# Patient Record
Sex: MALE | Race: WHITE | Employment: FULL TIME | URBAN - METROPOLITAN AREA
[De-identification: names, ages, dates, MRNs, and addresses within clinical notes are randomized per-mention and may not be internally consistent; named-entity substitution may affect disease eponyms.]

---

## 2017-11-29 ENCOUNTER — OFFICE VISIT (OUTPATIENT)
Dept: DERMATOLOGY | Age: 57
End: 2017-11-29

## 2017-11-29 DIAGNOSIS — L91.8 CUTANEOUS SKIN TAGS: ICD-10-CM

## 2017-11-29 DIAGNOSIS — L72.0 EPIDERMOID CYST: Primary | ICD-10-CM

## 2017-11-29 DIAGNOSIS — L82.1 SK (SEBORRHEIC KERATOSIS): ICD-10-CM

## 2017-11-29 PROCEDURE — 11200 RMVL SKIN TAGS UP TO&INC 15: CPT | Performed by: DERMATOLOGY

## 2017-11-29 PROCEDURE — 99213 OFFICE O/P EST LOW 20 MIN: CPT | Performed by: DERMATOLOGY

## 2017-11-29 NOTE — PROGRESS NOTES
declined any local anesthesia. There was minor bleeding but no complications. Return in about 1 year (around 11/29/2018).

## 2018-05-08 ENCOUNTER — OFFICE VISIT (OUTPATIENT)
Dept: INTERNAL MEDICINE CLINIC | Age: 58
End: 2018-05-08

## 2018-05-08 VITALS
BODY MASS INDEX: 33.74 KG/M2 | HEART RATE: 68 BPM | HEIGHT: 67 IN | WEIGHT: 215 LBS | SYSTOLIC BLOOD PRESSURE: 112 MMHG | TEMPERATURE: 98 F | DIASTOLIC BLOOD PRESSURE: 62 MMHG | RESPIRATION RATE: 16 BRPM

## 2018-05-08 DIAGNOSIS — Z00.00 PREVENTATIVE HEALTH CARE: Primary | ICD-10-CM

## 2018-05-08 DIAGNOSIS — E78.2 MIXED HYPERLIPIDEMIA: ICD-10-CM

## 2018-05-08 PROCEDURE — 99386 PREV VISIT NEW AGE 40-64: CPT | Performed by: INTERNAL MEDICINE

## 2018-05-08 RX ORDER — SIMVASTATIN 10 MG
10 TABLET ORAL NIGHTLY
COMMUNITY
End: 2018-05-08 | Stop reason: SDUPTHER

## 2018-05-08 RX ORDER — SIMVASTATIN 10 MG
10 TABLET ORAL NIGHTLY
Qty: 90 TABLET | Refills: 3 | Status: SHIPPED | OUTPATIENT
Start: 2018-05-08

## 2018-05-08 ASSESSMENT — PATIENT HEALTH QUESTIONNAIRE - PHQ9
1. LITTLE INTEREST OR PLEASURE IN DOING THINGS: 0
SUM OF ALL RESPONSES TO PHQ QUESTIONS 1-9: 0
2. FEELING DOWN, DEPRESSED OR HOPELESS: 0
SUM OF ALL RESPONSES TO PHQ9 QUESTIONS 1 & 2: 0

## 2018-05-09 ASSESSMENT — ENCOUNTER SYMPTOMS
EYE DISCHARGE: 0
ABDOMINAL DISTENTION: 0
CONSTIPATION: 0
BACK PAIN: 0
SHORTNESS OF BREATH: 0
ABDOMINAL PAIN: 0
WHEEZING: 0
COUGH: 0
SORE THROAT: 0
DIARRHEA: 0
BLOOD IN STOOL: 0
NAUSEA: 0
TROUBLE SWALLOWING: 0
RHINORRHEA: 0
VOMITING: 0
APNEA: 0
EYE PAIN: 0

## 2019-03-25 ENCOUNTER — TELEPHONE (OUTPATIENT)
Dept: DERMATOLOGY | Age: 59
End: 2019-03-25

## 2019-04-04 ENCOUNTER — OFFICE VISIT (OUTPATIENT)
Dept: DERMATOLOGY | Age: 59
End: 2019-04-04
Payer: COMMERCIAL

## 2019-04-04 DIAGNOSIS — L91.8 CUTANEOUS SKIN TAGS: ICD-10-CM

## 2019-04-04 DIAGNOSIS — L82.1 SEBORRHEIC KERATOSIS: Primary | ICD-10-CM

## 2019-04-04 DIAGNOSIS — Z80.8 FAMILY HISTORY OF SKIN CANCER: ICD-10-CM

## 2019-04-04 PROCEDURE — 99213 OFFICE O/P EST LOW 20 MIN: CPT | Performed by: DERMATOLOGY

## 2019-04-04 NOTE — PROGRESS NOTES
[facility]  Eben Hood MD  [phone]      Shasta Dykes  1960    62 y.o. male     Date of Visit: 4/4/2019    Chief Complaint: skin lesions    History of Present Illness:    1. He complains of a concerning lesion on the right flank. He has multiple other similar lesions on the trunk. 2.  He complains of a lesion lateral to the left eye. Dad with history of skin cancer. Review of Systems:  Skin: No new or changing moles. Past Medical History, Family History, Surgical History, Medications and Allergies reviewed. Past Medical History:   Diagnosis Date    Hyperlipidemia      History reviewed. No pertinent surgical history. No Known Allergies  Outpatient Medications Marked as Taking for the 4/4/19 encounter (Office Visit) with Norman Guzman MD   Medication Sig Dispense Refill    zoster recombinant adjuvanted vaccine [facility]) 50 MCG SUSR injection Inject 0.5 ml intramuscularly now and repeat dose in 2 months 1 each 1    simvastatin (ZOCOR) 10 MG tablet Take 1 tablet by mouth nightly 90 tablet 3    aspirin 81 MG tablet Take 81 mg by mouth daily. Physical Examination       The following were examined and determined to be normal: Psych/Neuro, Scalp/hair, Head/face, Conjunctivae/eyelids, Gums/teeth/lips, Neck, Breast/axilla/chest, Abdomen, Back, RUE, LUE, RLE, LLE and Nails/digits. The following were examined and determined to be abnormal: None. Well-appearing. 1.  Scattered on the trunk and face are multiple stuck on appearing verrucous brown papules and plaques. 2.  Left temple with a small pedunculated skin-colored papule. Assessment and Plan     1. Seborrheic keratoses -     Reassurance. 1 on the right flank treated with LN2:  2 cycles of liquid nitrogen applied to the SK on the right lateral flank (no charge).  Patient was educated regarding the potential risks of blister formation, discomfort, hypopigmentation,

## 2019-07-10 ENCOUNTER — HOSPITAL ENCOUNTER (OUTPATIENT)
Age: 59
Setting detail: OUTPATIENT SURGERY
Discharge: HOME | End: 2019-07-10
Attending: INTERNAL MEDICINE | Admitting: INTERNAL MEDICINE
Payer: COMMERCIAL

## 2019-07-10 VITALS
BODY MASS INDEX: 33.75 KG/M2 | TEMPERATURE: 97 F | SYSTOLIC BLOOD PRESSURE: 110 MMHG | WEIGHT: 210 LBS | DIASTOLIC BLOOD PRESSURE: 68 MMHG | HEART RATE: 60 BPM | HEIGHT: 66 IN | RESPIRATION RATE: 18 BRPM | OXYGEN SATURATION: 95 %

## 2019-07-10 PROCEDURE — 3609010300 HC COLONOSCOPY W/BIOPSY SINGLE/MULTIPLE: Performed by: INTERNAL MEDICINE

## 2019-07-10 PROCEDURE — 7100000010 HC PHASE II RECOVERY - FIRST 15 MIN: Performed by: INTERNAL MEDICINE

## 2019-07-10 PROCEDURE — 88305 TISSUE EXAM BY PATHOLOGIST: CPT

## 2019-07-10 PROCEDURE — 6360000002 HC RX W HCPCS: Performed by: INTERNAL MEDICINE

## 2019-07-10 PROCEDURE — 2709999900 HC NON-CHARGEABLE SUPPLY: Performed by: INTERNAL MEDICINE

## 2019-07-10 PROCEDURE — 99152 MOD SED SAME PHYS/QHP 5/>YRS: CPT | Performed by: INTERNAL MEDICINE

## 2019-07-10 PROCEDURE — 7100000011 HC PHASE II RECOVERY - ADDTL 15 MIN: Performed by: INTERNAL MEDICINE

## 2019-07-10 RX ORDER — MIDAZOLAM HYDROCHLORIDE 1 MG/ML
INJECTION INTRAMUSCULAR; INTRAVENOUS PRN
Status: DISCONTINUED | OUTPATIENT
Start: 2019-07-10 | End: 2019-07-10 | Stop reason: ALTCHOICE

## 2019-07-10 RX ORDER — MEPERIDINE HYDROCHLORIDE 50 MG/ML
INJECTION INTRAMUSCULAR; INTRAVENOUS; SUBCUTANEOUS PRN
Status: DISCONTINUED | OUTPATIENT
Start: 2019-07-10 | End: 2019-07-10 | Stop reason: ALTCHOICE

## 2019-07-10 ASSESSMENT — PAIN - FUNCTIONAL ASSESSMENT: PAIN_FUNCTIONAL_ASSESSMENT: 0-10

## 2019-07-10 NOTE — H&P
History and Physical / Pre-Sedation Assessment    Dianna Rogers is a 62 y.o. male who presents today for colonoscopy procedure. PMHx:    Past Medical History:   Diagnosis Date    Hyperlipidemia        Medications:    Prior to Admission medications    Medication Sig Start Date End Date Taking? Authorizing Provider   simvastatin (ZOCOR) 10 MG tablet Take 1 tablet by mouth nightly 18  Yes Gayla Jolley MD       Allergies: No Known Allergies    PSHx:  History reviewed. No pertinent surgical history.     Social Hx:    Social History     Socioeconomic History    Marital status:      Spouse name: Not on file    Number of children: Not on file    Years of education: Not on file    Highest education level: Not on file   Occupational History    Not on file   Social Needs    Financial resource strain: Not on file    Food insecurity:     Worry: Not on file     Inability: Not on file    Transportation needs:     Medical: Not on file     Non-medical: Not on file   Tobacco Use    Smoking status: Former Smoker     Last attempt to quit:      Years since quittin.5    Smokeless tobacco: Never Used   Substance and Sexual Activity    Alcohol use: Yes     Comment: Two drinks weekly    Drug use: No    Sexual activity: Yes     Partners: Female   Lifestyle    Physical activity:     Days per week: Not on file     Minutes per session: Not on file    Stress: Not on file   Relationships    Social connections:     Talks on phone: Not on file     Gets together: Not on file     Attends Temple service: Not on file     Active member of club or organization: Not on file     Attends meetings of clubs or organizations: Not on file     Relationship status: Not on file    Intimate partner violence:     Fear of current or ex partner: Not on file     Emotionally abused: Not on file     Physically abused: Not on file     Forced sexual activity: Not on file   Other Topics Concern    Not on file   Social

## 2019-07-10 NOTE — PROGRESS NOTES
Justice Vo is a 62 y.o. male patient. No current facility-administered medications for this encounter. No Known Allergies  Active Problems:    * No active hospital problems. *  Resolved Problems:    * No resolved hospital problems. *    Blood pressure 110/67, pulse 60, temperature 97.9 °F (36.6 °C), resp. rate 18, height 5' 6\" (1.676 m), weight 210 lb (95.3 kg), SpO2 97 %. Subjective:  Symptoms:  Stable. Diet:  Adequate intake. Activity level: Normal.    Pain:  He reports no pain. Objective:  General Appearance:  Comfortable, well-appearing and in no acute distress. Vital signs: (most recent): Blood pressure 110/67, pulse 60, temperature 97.9 °F (36.6 °C), resp. rate 18, height 5' 6\" (1.676 m), weight 210 lb (95.3 kg), SpO2 97 %. Vital signs are normal.    Output: Producing urine and producing stool. Lungs:  Normal effort. Heart: Normal rate. Abdomen: Abdomen is soft. There is no abdominal tenderness. Extremities: Normal range of motion. Neurological: Patient is alert and oriented to person, place and time. Skin:  Warm and dry.       Assessment & Plan    Shannon Shabazz RN  7/10/2019

## 2020-03-10 ENCOUNTER — OFFICE VISIT (OUTPATIENT)
Dept: DERMATOLOGY | Age: 60
End: 2020-03-10
Payer: COMMERCIAL

## 2020-03-10 PROCEDURE — 1036F TOBACCO NON-USER: CPT | Performed by: DERMATOLOGY

## 2020-03-10 PROCEDURE — G8427 DOCREV CUR MEDS BY ELIG CLIN: HCPCS | Performed by: DERMATOLOGY

## 2020-03-10 PROCEDURE — 99213 OFFICE O/P EST LOW 20 MIN: CPT | Performed by: DERMATOLOGY

## 2020-03-10 PROCEDURE — 3017F COLORECTAL CA SCREEN DOC REV: CPT | Performed by: DERMATOLOGY

## 2020-03-10 PROCEDURE — G8417 CALC BMI ABV UP PARAM F/U: HCPCS | Performed by: DERMATOLOGY

## 2020-03-10 PROCEDURE — G8484 FLU IMMUNIZE NO ADMIN: HCPCS | Performed by: DERMATOLOGY

## 2020-03-10 NOTE — PROGRESS NOTES
insulin resistance. Encouraged yearly physicals with PCP. Return in about 1 year (around 3/10/2021).

## 2021-08-12 ENCOUNTER — OFFICE VISIT (OUTPATIENT)
Dept: DERMATOLOGY | Age: 61
End: 2021-08-12
Payer: COMMERCIAL

## 2021-08-12 VITALS — TEMPERATURE: 97.8 F

## 2021-08-12 DIAGNOSIS — L83 ACANTHOSIS NIGRICANS: ICD-10-CM

## 2021-08-12 DIAGNOSIS — L82.1 SK (SEBORRHEIC KERATOSIS): Primary | ICD-10-CM

## 2021-08-12 PROCEDURE — 99212 OFFICE O/P EST SF 10 MIN: CPT | Performed by: DERMATOLOGY

## 2021-08-12 NOTE — PROGRESS NOTES
[facility]  Danial Bloom MD  [phone]      Mellisa Mckeon  1960    59 y.o. male     Date of Visit: 8/12/2021    Chief Complaint: skin lesions    History of Present Illness:    1. He presents today for evaluation of multiple lesions on the scalp, trunk and extremities. 2.  He also has asymptomatic skin changes in the axillae. Review of Systems:  Gen: Feels well, good sense of health. Past Medical History, Family History, Surgical History, Medications and Allergies reviewed. Past Medical History:   Diagnosis Date    Hyperlipidemia      Past Surgical History:   Procedure Laterality Date    COLONOSCOPY N/A 7/10/2019    COLONOSCOPY WITH BIOPSY OF RECTAL POLYP performed by Beka James MD at [facility] ENDOSCOPY       No Known Allergies  Outpatient Medications Marked as Taking for the 8/12/21 encounter (Office Visit) with Rupali Andre MD   Medication Sig Dispense Refill    simvastatin (ZOCOR) 10 MG tablet Take 1 tablet by mouth nightly 90 tablet 3       Physical Examination       The following were examined and determined to be normal: Psych/Neuro, Scalp/hair, Head/face, Conjunctivae/eyelids, Gums/teeth/lips, Neck, Abdomen, Back, RUE, LUE, RLE, LLE and Nails/digits. The following were examined and determined to be abnormal: Breast/axilla/chest.     Well appearing. 1.  Scalp, cheeks, trunk: stuck-on appearing tan-brown verrucous papules and plaques. 2.  Axilla with velvety brown skin of multiple pedunculated skin colored to brown papules. Assessment and Plan     1. SK (seborrheic keratosis) - multiple    Reassurance. 2. Acanthosis nigricans     Education. Encouraged yearly physical to monitor for insulin resistance. Return in about 1 year (around 8/12/2022).     --Rupali Andre MD

## 2022-08-16 ENCOUNTER — OFFICE VISIT (OUTPATIENT)
Dept: DERMATOLOGY | Age: 62
End: 2022-08-16
Payer: COMMERCIAL

## 2022-08-16 DIAGNOSIS — L72.0 EPIDERMOID CYST: ICD-10-CM

## 2022-08-16 DIAGNOSIS — L83 ACANTHOSIS NIGRICANS: ICD-10-CM

## 2022-08-16 DIAGNOSIS — L82.1 SK (SEBORRHEIC KERATOSIS): Primary | ICD-10-CM

## 2022-08-16 PROCEDURE — 99212 OFFICE O/P EST SF 10 MIN: CPT | Performed by: DERMATOLOGY

## 2022-08-16 NOTE — PROGRESS NOTES
[facility]  Benedict Kruger MD  [phone]      Reese Peres  1960    57 y.o. male     Date of Visit: 8/16/2022    Chief Complaint: skin lesions    History of Present Illness:    1. He presents today for newly noted lesion on the right temporal scalp. He also continues to develop new lesions on the scalp, face and left upper arm. 2.  He has a history of acanthosis nigra cans-remains stable. Fasting blood sugar in November 2021 just barely elevated at 108. 3.  He reports an asymptomatic stable lesion on the left lower abdomen. Review of Systems:  Gen: Feels well, good sense of health. Past Medical History, Family History, Surgical History, Medications and Allergies reviewed. Past Medical History:   Diagnosis Date    Hyperlipidemia      Past Surgical History:   Procedure Laterality Date    COLONOSCOPY N/A 7/10/2019    COLONOSCOPY WITH BIOPSY OF RECTAL POLYP performed by Regis Waite MD at [facility] ENDOSCOPY       No Known Allergies  Outpatient Medications Marked as Taking for the 8/16/22 encounter (Office Visit) with Kedar Verduzco MD   Medication Sig Dispense Refill    simvastatin (ZOCOR) 10 MG tablet Take 1 tablet by mouth nightly 90 tablet 3       Physical Examination       The following were examined and determined to be normal: Psych/Neuro, Scalp/hair, Head/face, Conjunctivae/eyelids, Gums/teeth/lips, Neck, Breast/axilla/chest, Abdomen, Back, RUE, LUE, RLE, LLE, and Nails/digits. The following were examined and determined to be abnormal: Breast/axilla/chest.     Well-appearing. 1.  Scattered on the scalp, lateral portions of the face, left upper arm with stuck on appearing verrucous brown papules and plaques. 2.  Axilla and posterior neck with velvety brown skin. Axilla also with multiple skin tags. 3.  Left lower abdomen with about a 2 cm round subcutaneous nodule with eccentric overlying punctum. Assessment and Plan     1.  SK (seborrheic keratosis) - multiple    Reassurance. 2. Acanthosis nigricans -     Discussed association with insulin resistance. Continue check of fasting blood sugar every year with primary care physician. 3. Epidermoid cyst -asymptomatic    Consider excision if it enlarges or becomes symptomatic.          --Huber López MD

## 2023-02-14 ENCOUNTER — OFFICE VISIT (OUTPATIENT)
Dept: DERMATOLOGY | Age: 63
End: 2023-02-14
Payer: COMMERCIAL

## 2023-02-14 DIAGNOSIS — L82.1 SK (SEBORRHEIC KERATOSIS): Primary | ICD-10-CM

## 2023-02-14 DIAGNOSIS — L81.4 SOLAR LENTIGINOSIS: ICD-10-CM

## 2023-02-14 DIAGNOSIS — L72.0 EPIDERMOID CYST: ICD-10-CM

## 2023-02-14 PROCEDURE — 99213 OFFICE O/P EST LOW 20 MIN: CPT | Performed by: DERMATOLOGY

## 2023-02-14 NOTE — PROGRESS NOTES
[facility]  Logan Andre MD  [phone]      Leodan Montaño  1960    57 y.o. male     Date of Visit: 2/14/2023    Chief Complaint: skin lesions    History of Present Illness:    1. He presents today for several persistent growths on the scalp and face. 2.  He also reports a chronic asymptomatic lesion on the left lower abdomen. 3.  He has stable pigmented lesions on the scalp and upper extremities. Review of Systems:  Gen: Feels well, good sense of health. Past Medical History, Family History, Surgical History, Medications and Allergies reviewed. Past Medical History:   Diagnosis Date    Hyperlipidemia      Past Surgical History:   Procedure Laterality Date    COLONOSCOPY N/A 7/10/2019    COLONOSCOPY WITH BIOPSY OF RECTAL POLYP performed by Nuha Shore MD at [facility] ENDOSCOPY       No Known Allergies  Outpatient Medications Marked as Taking for the 2/14/23 encounter (Office Visit) with Blue Ventura MD   Medication Sig Dispense Refill    simvastatin (ZOCOR) 10 MG tablet Take 1 tablet by mouth nightly 90 tablet 3         Physical Examination       The following were examined and determined to be normal: Psych/Neuro, Scalp/hair, Head/face, Conjunctivae/eyelids, Gums/teeth/lips, Neck, Breast/axilla/chest, Abdomen, Back, RUE, LUE, RLE, LLE, and Nails/digits. The following were examined and determined to be abnormal: None. Well appearing. 1.  Sides of the face, scalp, and back: stuck-on appearing tan-brown verrucous papules and plaques. 2.  Left lower abdomen - 2 cm round skin colored nodule with an overlying punctum. 3.  Vertex scalp and forearms with multiple smooth light brown macules. Assessment and Plan     1. SK (seborrheic keratosis) - multiple    Reassurance. 2. Larger epidermoid cyst on the left lower abdomen - asymptomatic    Consider excision if enlarges or becomes symptomatic.       3. Solar lentiginosis     Monitor for change. Sun protective behaviors encouraged including use of at least SPF 30 or more sunscreen.           --Julia Joyce MD

## 2023-11-14 ENCOUNTER — OFFICE VISIT (OUTPATIENT)
Dept: DERMATOLOGY | Age: 63
End: 2023-11-14
Payer: COMMERCIAL

## 2023-11-14 DIAGNOSIS — L28.0 LICHEN SIMPLEX CHRONICUS: ICD-10-CM

## 2023-11-14 DIAGNOSIS — L71.9 ROSACEA: ICD-10-CM

## 2023-11-14 DIAGNOSIS — L82.1 SK (SEBORRHEIC KERATOSIS): Primary | ICD-10-CM

## 2023-11-14 PROCEDURE — 1123F ACP DISCUSS/DSCN MKR DOCD: CPT | Performed by: DERMATOLOGY

## 2023-11-14 PROCEDURE — 99213 OFFICE O/P EST LOW 20 MIN: CPT | Performed by: DERMATOLOGY

## 2023-11-14 NOTE — PROGRESS NOTES
[facility]  [place] [facility]      Bianca Fentress  1960    70 y.o. male     Date of Visit: 11/14/2023    Chief Complaint: skin lesions    History of Present Illness:    1. He presents today for multiple growths on the scalp and face. None are bothersome. 2.  He also has an asymptomatic eruption on the central face. 3.  He experiences some itching on the back; his wife scratches the area regularly. Review of Systems:  Gen: Feels well, good sense of health. Past Medical History, Family History, Surgical History, Medications and Allergies reviewed. Past Medical History:   Diagnosis Date    Hyperlipidemia      Past Surgical History:   Procedure Laterality Date    COLONOSCOPY N/A 7/10/2019    COLONOSCOPY WITH BIOPSY OF RECTAL POLYP performed by Elizabeth Barrow MD at [facility] ENDOSCOPY       No Known Allergies  Outpatient Medications Marked as Taking for the 11/14/23 encounter (Office Visit) with Dariel Alvarez MD   Medication Sig Dispense Refill    simvastatin (ZOCOR) 10 MG tablet Take 1 tablet by mouth nightly 90 tablet 3         Physical Examination       The following were examined and determined to be normal: Psych/Neuro, Scalp/hair, Conjunctivae/eyelids, Gums/teeth/lips, Neck, Breast/axilla/chest, Abdomen, RUE, LUE, RLE, LLE, and Nails/digits. The following were examined and determined to be abnormal: Head/face and Back. Well appearing. 1.  Scalp and sides of the face with multiple stuck-on appearing tan-brown verrucous papules and plaques. 2.  Lower face, central face with multiple small erythematous papules. 3.  Mid back with lichenification. Assessment and Plan     1. SK (seborrheic keratosis) - multiple    Reassurance. 2. Rosacea -mild papulopustular    Not bothersome. Patient declined treatment. Consider topical metronidazole 0.75% cream in the future.      3. Lichen simplex chronicus -not bothersome to the

## 2024-08-13 ENCOUNTER — OFFICE VISIT (OUTPATIENT)
Dept: DERMATOLOGY | Age: 64
End: 2024-08-13
Payer: COMMERCIAL

## 2024-08-13 DIAGNOSIS — L72.0 EPIDERMOID CYST: ICD-10-CM

## 2024-08-13 DIAGNOSIS — L82.1 SK (SEBORRHEIC KERATOSIS): Primary | ICD-10-CM

## 2024-08-13 DIAGNOSIS — Z80.8 FAMILY HISTORY OF SKIN CANCER: ICD-10-CM

## 2024-08-13 PROCEDURE — 99212 OFFICE O/P EST SF 10 MIN: CPT | Performed by: DERMATOLOGY

## 2024-08-13 PROCEDURE — 1123F ACP DISCUSS/DSCN MKR DOCD: CPT | Performed by: DERMATOLOGY

## 2024-08-13 NOTE — PROGRESS NOTES
[facility]  Dung Villela MD  [phone]      Manas Mclaughlin  1960    64 y.o. male     Date of Visit: 8/13/2024    Chief Complaint: skin lesions    History of Present Illness:    1.  He reports a persistent growth on the vertex scalp.  He has similar lesions on the face as well.    2.  He also reports a longstanding lesion on the left lower abdomen that has gradually enlarged with time.  He denies any pain or discomfort.    3.  He reports that his father had a history of multiple skin cancers, primarily on the face.      Review of Systems:  Gen: Feels well, good sense of health.    Past Medical History, Family History, Surgical History, Medications and Allergies reviewed.    Past Medical History:   Diagnosis Date    Hyperlipidemia      Past Surgical History:   Procedure Laterality Date    COLONOSCOPY N/A 7/10/2019    COLONOSCOPY WITH BIOPSY OF RECTAL POLYP performed by Yaw SKELTON MD at [facility] ENDOSCOPY       No Known Allergies  Outpatient Medications Marked as Taking for the 8/13/24 encounter (Office Visit) with Dung Villela MD   Medication Sig Dispense Refill    simvastatin (ZOCOR) 10 MG tablet Take 1 tablet by mouth nightly 90 tablet 3         Physical Examination       Well appearing.    1.  Vertex scalp, face with stuck-on appearing tan-brown verrucous papules and plaques.     2.  Left lower abdomen with a 3 cm skin colored nodule with an overlying punctum.        Assessment and Plan     1. SK (seborrheic keratosis) - multiple    Reassurance.      2. Epidermoid cyst -asymptomatic and not bothersome.    Consider excision in the future if enlarges or becomes symptomatic.     3. Family history of skin cancer     Sun protective behaviors, including use of at least SPF 30 sunscreen, and self skin examinations were encouraged.  Call for any new or concerning lesions.             --Dung Villela MD

## 2025-05-14 ENCOUNTER — OFFICE VISIT (OUTPATIENT)
Age: 65
End: 2025-05-14
Payer: COMMERCIAL

## 2025-05-14 DIAGNOSIS — L73.1 INGROWN HAIR: ICD-10-CM

## 2025-05-14 DIAGNOSIS — L82.1 SK (SEBORRHEIC KERATOSIS): ICD-10-CM

## 2025-05-14 DIAGNOSIS — L72.0 EPIDERMOID CYST: Primary | ICD-10-CM

## 2025-05-14 PROCEDURE — 1123F ACP DISCUSS/DSCN MKR DOCD: CPT | Performed by: DERMATOLOGY

## 2025-05-14 PROCEDURE — 99212 OFFICE O/P EST SF 10 MIN: CPT | Performed by: DERMATOLOGY

## 2025-05-14 NOTE — PROGRESS NOTES
[facility]  Dung Villela MD  [phone]      Manas Mclaughlin  1960    65 y.o. male     Date of Visit: 5/14/2025    Chief Complaint: skin lesions to be evaluated    History of Present Illness:    1.  He has a cyst on the left lower abdomen - has reduced in size.  Not symptomatic.     2.  He has multiple growths on the scalp, face and trunk - none are bothersome.     3.  He has a lesion on the left temple.     He reports that his father had a history of multiple skin cancers, primarily on the face.       Review of Systems:  Gen: Feels well, good sense of health.      Past Medical History, Family History, Surgical History, Medications and Allergies reviewed.    Past Medical History:   Diagnosis Date    Hyperlipidemia      Past Surgical History:   Procedure Laterality Date    COLONOSCOPY N/A 7/10/2019    COLONOSCOPY WITH BIOPSY OF RECTAL POLYP performed by Yaw SKELTON MD at [facility] ENDOSCOPY       No Known Allergies  Outpatient Medications Marked as Taking for the 5/14/25 encounter (Office Visit) with Dung Villela MD   Medication Sig Dispense Refill    simvastatin (ZOCOR) 10 MG tablet Take 1 tablet by mouth nightly 90 tablet 3         Physical Examination       Well appearing.    1.  Left lower abdomen at the waistline with about a 2 cm subcutaneous nodule with overlying punctum.     2.  Scattered on the scalp, face and trunk are numerous stuck on appearing verrucous brown papules and plaques.    3.  Left temple with a small skin colored to whitish papule with visible ingrown hair.         Assessment and Plan     1. Epidermoid cyst-asymptomatic and not bothersome    Consider excision if enlarges or becomes painful.     2. SK (seborrheic keratosis)     Reassurance.      3. Ingrown hair -     Reassurance.     Removed with a #11 blade.         Return in about 1 year (around 5/14/2026).    --Dung Villela MD

## (undated) DEVICE — FORCEPS BX L240CM DIA2.4MM L NDL RAD JAW 4 133340